# Patient Record
Sex: MALE | Employment: OTHER | URBAN - METROPOLITAN AREA
[De-identification: names, ages, dates, MRNs, and addresses within clinical notes are randomized per-mention and may not be internally consistent; named-entity substitution may affect disease eponyms.]

---

## 2018-02-01 ENCOUNTER — TRANSFERRED RECORDS (OUTPATIENT)
Dept: HEALTH INFORMATION MANAGEMENT | Facility: CLINIC | Age: 68
End: 2018-02-01

## 2020-03-30 ENCOUNTER — TRANSFERRED RECORDS (OUTPATIENT)
Dept: HEALTH INFORMATION MANAGEMENT | Facility: CLINIC | Age: 70
End: 2020-03-30

## 2021-10-12 ENCOUNTER — TRANSFERRED RECORDS (OUTPATIENT)
Dept: HEALTH INFORMATION MANAGEMENT | Facility: CLINIC | Age: 71
End: 2021-10-12

## 2022-07-01 ENCOUNTER — TRANSCRIBE ORDERS (OUTPATIENT)
Dept: OTHER | Age: 72
End: 2022-07-01

## 2022-07-01 ENCOUNTER — MEDICAL CORRESPONDENCE (OUTPATIENT)
Dept: HEALTH INFORMATION MANAGEMENT | Facility: CLINIC | Age: 72
End: 2022-07-01

## 2022-07-01 DIAGNOSIS — S06.0X9A CONCUSSION WITH LOSS OF CONSCIOUSNESS: Primary | ICD-10-CM

## 2022-07-25 ENCOUNTER — VIRTUAL VISIT (OUTPATIENT)
Dept: NEUROLOGY | Facility: CLINIC | Age: 72
End: 2022-07-25
Attending: PHYSICIAN ASSISTANT
Payer: COMMERCIAL

## 2022-07-25 DIAGNOSIS — R40.4 EPISODE OF UNRESPONSIVENESS: Primary | ICD-10-CM

## 2022-07-25 DIAGNOSIS — G47.9 SLEEP DISORDER: ICD-10-CM

## 2022-07-25 PROCEDURE — 99204 OFFICE O/P NEW MOD 45 MIN: CPT | Mod: 95 | Performed by: NURSE PRACTITIONER

## 2022-07-25 RX ORDER — FAMOTIDINE 20 MG/1
TABLET, FILM COATED ORAL
COMMUNITY
Start: 2022-07-01

## 2022-07-25 RX ORDER — HYDROCHLOROTHIAZIDE 25 MG/1
1 TABLET ORAL DAILY
COMMUNITY
Start: 2021-03-08 | End: 2023-04-06

## 2022-07-25 RX ORDER — TIMOLOL MALEATE 5 MG/ML
SOLUTION/ DROPS OPHTHALMIC
COMMUNITY
Start: 2022-02-17

## 2022-07-25 RX ORDER — LISINOPRIL 20 MG/1
1 TABLET ORAL DAILY
COMMUNITY
Start: 2021-02-04 | End: 2023-04-06

## 2022-07-25 RX ORDER — HYDROXYZINE HYDROCHLORIDE 10 MG/1
TABLET, FILM COATED ORAL
COMMUNITY
Start: 2022-07-01 | End: 2023-04-06

## 2022-07-25 RX ORDER — CHOLECALCIFEROL (VITAMIN D3) 50 MCG
1 TABLET ORAL DAILY
COMMUNITY

## 2022-07-25 RX ORDER — ASPIRIN 81 MG/1
81 TABLET ORAL DAILY
COMMUNITY

## 2022-07-25 RX ORDER — SILDENAFIL 100 MG/1
TABLET, FILM COATED ORAL
COMMUNITY
Start: 2021-10-12
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
## 2022-07-25 NOTE — PROGRESS NOTES
How would you like to obtain your AVS? Mail  If the video is dropped, the invitation should be resent by: [phone]

## 2022-07-28 ENCOUNTER — TELEPHONE (OUTPATIENT)
Dept: NEUROLOGY | Facility: CLINIC | Age: 72
End: 2022-07-28

## 2022-07-28 NOTE — TELEPHONE ENCOUNTER
----- Message from RASHID Sharma CNP sent at 7/25/2022  8:03 PM CDT -----  Follow up in 10 weeks

## 2022-08-21 ENCOUNTER — HEALTH MAINTENANCE LETTER (OUTPATIENT)
Age: 72
End: 2022-08-21

## 2022-09-06 ENCOUNTER — TRANSFERRED RECORDS (OUTPATIENT)
Dept: HEALTH INFORMATION MANAGEMENT | Facility: CLINIC | Age: 72
End: 2022-09-06

## 2022-09-06 LAB — EJECTION FRACTION: NORMAL %

## 2022-11-11 ENCOUNTER — TRANSFERRED RECORDS (OUTPATIENT)
Dept: HEALTH INFORMATION MANAGEMENT | Facility: CLINIC | Age: 72
End: 2022-11-11

## 2022-11-21 ENCOUNTER — HEALTH MAINTENANCE LETTER (OUTPATIENT)
Age: 72
End: 2022-11-21

## 2023-01-09 ENCOUNTER — TELEPHONE (OUTPATIENT)
Dept: PEDIATRICS | Facility: CLINIC | Age: 73
End: 2023-01-09
Payer: COMMERCIAL

## 2023-01-09 NOTE — TELEPHONE ENCOUNTER
Reason for Call:  Other Referral    Detailed comments: Pt needs a referral for cardiology.  Pt sees Dr. Richards at the [facility], [place] at the [place] Location.    Phone Number Patient can be reached at: Cell number on file:    Telephone Information:   Mobile [phone]       Best Time: anytime    Can we leave a detailed message on this number? YES    Call taken on 1/9/2023 at 12:57 PM by Lupe Rhodes

## 2023-01-11 NOTE — TELEPHONE ENCOUNTER
Patient is already at [facility], does he need a new referral? If so, can he make an appointment to establish care in order to get new referral. Please let patient know that I am not seeing patient for another two weeks, he can schedule then and I will do referral at that time.   Leah Freeman PA-C on 1/11/2023 at 9:43 AM

## 2023-01-11 NOTE — TELEPHONE ENCOUNTER
1st attempt unable to leave a message, mailbox full will try again at a later date.   Randee Coe on 1/11/2023 at 9:48 AM

## 2023-01-16 NOTE — TELEPHONE ENCOUNTER
Haylie(wife) returned call    Best number to reach caller: [phone]    Is it ok to leave a detailed message: YES   Needs to speak to someone about [facility] referrals. He has an appointment this friday 01/20. She has not been able to get through to anyone at the clinic and has since cleared her voicemail if she misses another call. She is off all day today 01/16 and will be on call tomorrow. Please call to discuss these referrals are in place for insurance purposes.  Thank you

## 2023-01-16 NOTE — TELEPHONE ENCOUNTER
I called and spoke to Haylie. I relayed the providers message about establishing care and the situation is: The pt has an appointment on 1/20/23 at [facility] and he needs a new referral to be sent over to them before that date. The referrals are needed because the pt's insurance changed on 1/1/23. Also the pt has been seeing a vitreo retinal specialist and has an appointment on 1/23/23. Per Haylie those two referrals need to be placed before the pt can be seen to establish care with Leah here at the clinic. She did also inform me that they went to their old clinic and filled out a DARYA for the pt's medical records from that place. They were told it would take around 2-3 weeks from them to gather the records and send them over to us.  If possible please place the new referrals that are needed. KERRI the fax number for [facility] in [place] is [phone] and the referral for Dr. Darby Dev the vitreo retinal specialist is [phone]. I'm routing this message to a colleague of Leah Freeman per an in person conversation that was held when Leah stopped by the Station. At that time I was instructed to route all in basket messages that may come up to her colleagues so that they maybe addressed quickly because Leah wouldn't regularly be in her in basket.   Randee Coe on 1/16/2023 at 4:03 PM

## 2023-02-21 ENCOUNTER — TELEPHONE (OUTPATIENT)
Dept: PEDIATRICS | Facility: CLINIC | Age: 73
End: 2023-02-21
Payer: COMMERCIAL

## 2023-02-21 DIAGNOSIS — H34.8120 CENTRAL RETINAL VEIN OCCLUSION WITH MACULAR EDEMA OF LEFT EYE (H): ICD-10-CM

## 2023-02-21 DIAGNOSIS — H35.9 RETINA DISORDER: ICD-10-CM

## 2023-02-21 DIAGNOSIS — I25.10 CORONARY ARTERY DISEASE INVOLVING NATIVE HEART, UNSPECIFIED VESSEL OR LESION TYPE, UNSPECIFIED WHETHER ANGINA PRESENT: Primary | ICD-10-CM

## 2023-02-21 NOTE — TELEPHONE ENCOUNTER
Pt is transferring from my previous clinic. From what I understand he has a few referrals he needs to continue his care.  1. cardiology  2. Eye    Please call and get the information that I need to sign the orders. Location, MD, clinic name, speciality, dx  Thank you  Leah Freeman PA-C on 2/21/2023 at 1:20 PM

## 2023-02-21 NOTE — TELEPHONE ENCOUNTER
I called and the pt sees for 1) Cardiology a Dr. Monalisa Davila at [facility]. He had some stents put in of Nov of 2022. Fax number for Dr. Davila is [phone].    2) Eye he sees a Dr. Wilner Jones at [facility]  [facility] in [place]. The pt sees this provider for shots in his eyes every 3 months. Fax number for Dr. Jones is [phone].     Randee Coe on 2/21/2023 at 1:37 PM

## 2023-02-22 PROBLEM — E78.5 DYSLIPIDEMIA: Status: ACTIVE | Noted: 2018-02-28

## 2023-02-22 PROBLEM — Z86.0100 HISTORY OF COLONIC POLYPS: Status: ACTIVE | Noted: 2019-12-10

## 2023-02-22 PROBLEM — I10 HTN (HYPERTENSION): Status: ACTIVE | Noted: 2023-02-22

## 2023-02-22 PROBLEM — E55.9 HYPOVITAMINOSIS D: Status: ACTIVE | Noted: 2023-02-22

## 2023-02-22 PROBLEM — N52.9 ED (ERECTILE DYSFUNCTION): Status: ACTIVE | Noted: 2023-02-22

## 2023-02-22 PROBLEM — H34.8120 CENTRAL RETINAL VEIN OCCLUSION WITH MACULAR EDEMA OF LEFT EYE (H): Status: ACTIVE | Noted: 2020-01-24

## 2023-02-22 RX ORDER — ATORVASTATIN CALCIUM 20 MG/1
1 TABLET, FILM COATED ORAL DAILY
COMMUNITY
Start: 2021-01-21 | End: 2023-04-06

## 2023-02-22 RX ORDER — TICAGRELOR 90 MG/1
TABLET ORAL
COMMUNITY
Start: 2023-02-07

## 2023-02-22 RX ORDER — ATORVASTATIN CALCIUM 20 MG/1
1 TABLET, FILM COATED ORAL
COMMUNITY
Start: 2023-01-22 | End: 2023-04-06

## 2023-04-06 ENCOUNTER — MYC MEDICAL ADVICE (OUTPATIENT)
Dept: PEDIATRICS | Facility: CLINIC | Age: 73
End: 2023-04-06
Payer: COMMERCIAL

## 2023-04-06 DIAGNOSIS — F41.9 ANXIETY: ICD-10-CM

## 2023-04-06 DIAGNOSIS — E78.5 DYSLIPIDEMIA: Primary | ICD-10-CM

## 2023-04-06 DIAGNOSIS — I10 PRIMARY HYPERTENSION: ICD-10-CM

## 2023-04-06 RX ORDER — LISINOPRIL 20 MG/1
20 TABLET ORAL DAILY
Qty: 90 TABLET | Refills: 3 | Status: SHIPPED | OUTPATIENT
Start: 2023-04-06

## 2023-04-06 RX ORDER — HYDROCHLOROTHIAZIDE 25 MG/1
25 TABLET ORAL DAILY
Qty: 90 TABLET | Refills: 3 | Status: SHIPPED | OUTPATIENT
Start: 2023-04-06

## 2023-04-06 RX ORDER — ATORVASTATIN CALCIUM 20 MG/1
20 TABLET, FILM COATED ORAL DAILY
Qty: 90 TABLET | Refills: 3 | Status: SHIPPED | OUTPATIENT
Start: 2023-04-06

## 2023-04-06 RX ORDER — HYDROXYZINE HYDROCHLORIDE 10 MG/1
TABLET, FILM COATED ORAL
Qty: 100 TABLET | Refills: 11 | Status: SHIPPED | OUTPATIENT
Start: 2023-04-06
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
## 2023-08-21 ENCOUNTER — TRANSCRIBE ORDERS (OUTPATIENT)
Dept: OTHER | Age: 73
End: 2023-08-21

## 2023-08-21 DIAGNOSIS — I25.10 CAD IN NATIVE ARTERY: Primary | ICD-10-CM

## 2023-08-21 DIAGNOSIS — I44.1 AV BLOCK, 2ND DEGREE: ICD-10-CM

## 2023-08-21 DIAGNOSIS — I10 PRIMARY HYPERTENSION: ICD-10-CM

## 2023-09-16 ENCOUNTER — HEALTH MAINTENANCE LETTER (OUTPATIENT)
Age: 73
End: 2023-09-16

## 2025-01-04 ENCOUNTER — HEALTH MAINTENANCE LETTER (OUTPATIENT)
Age: 75
End: 2025-01-04